# Patient Record
Sex: FEMALE | Race: WHITE | URBAN - NONMETROPOLITAN AREA
[De-identification: names, ages, dates, MRNs, and addresses within clinical notes are randomized per-mention and may not be internally consistent; named-entity substitution may affect disease eponyms.]

---

## 2020-02-03 ENCOUNTER — TELEPHONE (OUTPATIENT)
Dept: NEUROSURGERY | Age: 60
End: 2020-02-03

## 2020-02-03 NOTE — TELEPHONE ENCOUNTER
Neurosurgical New Patient Questionnaire       Referring physician?        homecare nurse    Reason for referral?         Brain aneurysm    Who is completing questionnaire? patient     Has the patient had any previous spinal/brain surgeries? yes    If yes, where was the surgery preformed? Brain aneurysm        Who was the surgeon? Dr Gabriella Skinner     When was this surgery? 2005       MRI images obtained?        yes      If yes,  where was the MRI performed? [facility]    Note: If scan was performed at a facility other than [facility], the disk will need to be brought to appointment. Patient agreed to bring disk? yes     What part of the body?       brain     When was it performed? Nov 2019    Has the patient had a NCV/EMG?  no      Physical Therapy?         no        Pain Management?          no       Is the patient currently taking anything for pain control?   no     Employment Status?        disabled           Symptoms? Headaches every day, off balance.

## 2020-02-17 ENCOUNTER — OFFICE VISIT (OUTPATIENT)
Dept: NEUROSURGERY | Age: 60
End: 2020-02-17
Payer: MEDICAID

## 2020-02-17 VITALS
HEIGHT: 65 IN | HEART RATE: 73 BPM | DIASTOLIC BLOOD PRESSURE: 74 MMHG | BODY MASS INDEX: 31.32 KG/M2 | SYSTOLIC BLOOD PRESSURE: 120 MMHG | WEIGHT: 188 LBS

## 2020-02-17 PROCEDURE — 99204 OFFICE O/P NEW MOD 45 MIN: CPT | Performed by: NEUROLOGICAL SURGERY

## 2020-02-17 RX ORDER — AMLODIPINE BESYLATE 10 MG/1
1 TABLET ORAL DAILY
COMMUNITY
Start: 2019-12-10

## 2020-02-17 RX ORDER — LORAZEPAM 0.5 MG/1
1 TABLET ORAL PRN
COMMUNITY
Start: 2019-12-06

## 2020-02-17 RX ORDER — LOSARTAN POTASSIUM 100 MG/1
1 TABLET ORAL DAILY
COMMUNITY
Start: 2019-12-10

## 2020-02-17 NOTE — PROGRESS NOTES
[facility] Neurosurgery  Office Visit        Chief Complaint   Patient presents with   [facility] Consultation     f/u for brain aneurysm. HISTORY OF PRESENT ILLNESS:      The patient is a 64 y.o. female who presents for neurosurgical assessment of a recent episode of transient vision loss in her left eye as well as chronic headaches. 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 She endorses chronic daily headaches that are frontal in location. She denies associated photophobia or meningismus. She also states that approximately five months ago she suddenly and temporarily lost vision in her left eye. The vision spontaneously recovered and she did not go to the ED or seek immediate medical care, however she did mention it to her PCP some time later and a CTA was done at an outside facility. Those images are not available to review, however the report mentions patent major arteries with the expected artifact from the coil mass. There was no mention of any subarachnoid hemorrhage in the report. This was apparently an isolated event and the vision loss has not recurred. She denies any other numbness or weakness in her extremities. She does note significant daily difficulty with memory, planning, etc and these have been present since her hemorrhage and aneurysm coiling. She is not certain whether the aneurysm coils that were placed are MRI compatible. She states that she was placed on ASA after the coiling procedure, however she does not believe that a stent was used. She states that she stopped taking ASA some time ago due to GI bleeding.   She has not had a recent cerebral angiogram.      MEDICAL HISTORY:             Hypertension  Ruptured Acom aneurysm s/p coiling - 2005  Anxiety         Current Outpatient Medications:     amLODIPine (NORVASC) 10 MG tablet, Take 1 tablet by mouth daily, Disp: , Rfl:     LORazepam (ATIVAN) 0.5 MG tablet, Take 1 tablet by mouth as needed. , Disp: , Rfl:     losartan (COZAAR) 100 MG tablet, Take 1 tablet by mouth daily, Disp: , Rfl:     Allergies:  Latex    Social History:   Social History     Tobacco Use   Smoking Status Current Every Day Smoker   Smokeless Tobacco Never Used     Social History     Substance and Sexual Activity   Alcohol Use Yes         Family History:   She reports she has a cousin that was found to have five cerebral aneurysms    REVIEW OF SYSTEMS:    Constitutional: Negative. HENT: Negative. Eyes: Positive for blurred vision. Respiratory: Negative. Cardiovascular: Positive for palpitations. Gastrointestinal: Negative. Genitourinary: Negative. Musculoskeletal: Positive for joint pain. Skin: Negative. Neurological: Positive for headaches. Endo/Heme/Allergies: Negative. Psychiatric/Behavioral: Positive for depression. The patient is nervous/anxious and has insomnia. Review of Systems was obtained by the medical assistant and reviewed by myself. PHYSICAL EXAM:    Vitals:    02/17/20 1307   BP: 120/74   Pulse: 73       Constitutional: Appears well-developed and well-nourished. Eyes - conjunctiva normal.  Pupils react to light  Ear, nose,throat -Normal pinna and tragus, No scars, or lesions over external nose or ears, no obvious atrophy of tongue  Neck- symmetric, trachea midline, no jugular vein distension  Respiration- chest wall symmetric, normal effort without use of accessory muscles  Musculoskeletal - no significant wasting of muscles noted, no bony deformities, symmetric bulk  Extremities- no clubbing, cyanosis or edema  Skin - warm, dry, and intact. No rash,erythema, or pallor.   Psychiatric - mood, affect, and behavior appear normal.       NEUROLOGIC EXAM:    MENTAL STATUS: Alert, oriented, thought content appropriate    CRANIAL NERVES: PERRL, EOMI, symmetric facies, tongue midline    VISUAL FIELDS: Full to confrontation bilaterally      MOTOR:     Right Upper Extremity:    Deltoid: 5/5  Biceps: 5/5  Triceps: 5/5  : 5/5      Left Upper Extremity:    Deltoid: 5/5  Biceps: 5/5  Triceps: 5/5  : 5/5      Right Lower Extremity:    Hip Flexion: 5/5  Knee Extension: 5/5  Ankle Plantarflexion: 5/5  Ankle Dorsiflexion: 5/5    Left Lower Extremity:    Hip Flexion: 5/5  Knee Extension: 5/5  Ankle Plantarflexion: 5/5  Ankle Dorsiflexion: 5/5      SOMATOSENSORY:     Right Upper Extremity: normal light touch sensation  Left Upper Extremity: normal light touch sensation  Right Lower Extremity: normal light touch sensation  Left Lower Extremity: normal light touch sensation      REFLEXES:    Biceps: 2+  Patella: 2+    Nieves's: Negative  Clonus: Absent    COORDINATION and GAIT:    Gait: Normal        ASSESSMENT AND PLAN:  This is a 64 y.o. female with a history of a ruptured anterior communicating artery aneurysm in 2005 s/p coiling. She had an  isolated episode of transient vision loss in her left eye several months ago that is concerning for a possible TIA. She also has chronic daily headaches. I explained to her that assessment of previously-coiled aneurysms is beyond our technical capabilities here at [place] and recommended she be evaluated at a tertiary care center that provides endovascular services. I explained that she may need additional studies such as an MRI/MRA or catheter angiography to evaluate for any recurrence or residual filling of her aneurysm and to further evaluate the cause of her vision loss. I have placed a referral to Dr. Blanka Nino at the [facility] for further evaluation. I recommended that she obtain a CD containing images with all of her recent and past neuroimaging studies to bring with her and also that she bring all records from her past hospitalization/coiling procedure with her as well. She may follow up with me on an as-needed basis.         Nettie Jogre MD